# Patient Record
Sex: MALE
[De-identification: names, ages, dates, MRNs, and addresses within clinical notes are randomized per-mention and may not be internally consistent; named-entity substitution may affect disease eponyms.]

---

## 2024-04-10 ENCOUNTER — NON-APPOINTMENT (OUTPATIENT)
Age: 24
End: 2024-04-10

## 2024-04-10 ENCOUNTER — APPOINTMENT (OUTPATIENT)
Dept: GASTROENTEROLOGY | Facility: CLINIC | Age: 24
End: 2024-04-10
Payer: COMMERCIAL

## 2024-04-10 VITALS
WEIGHT: 205 LBS | DIASTOLIC BLOOD PRESSURE: 70 MMHG | BODY MASS INDEX: 24.96 KG/M2 | HEIGHT: 76 IN | SYSTOLIC BLOOD PRESSURE: 110 MMHG

## 2024-04-10 DIAGNOSIS — R19.7 DIARRHEA, UNSPECIFIED: ICD-10-CM

## 2024-04-10 PROBLEM — Z00.00 ENCOUNTER FOR PREVENTIVE HEALTH EXAMINATION: Status: ACTIVE | Noted: 2024-04-10

## 2024-04-10 PROCEDURE — 99203 OFFICE O/P NEW LOW 30 MIN: CPT

## 2024-04-10 NOTE — HISTORY OF PRESENT ILLNESS
[FreeTextEntry1] : Mr. JESSICA BELLA is a 23-year-old male with no PMH. Nickname is Jun his middle name.  No PSH.   Presents for diarrhea, rectal bleeding, L side abdominal pain which is now resolved. Denies recent travel, food contamination however eats a lot sushi.  No pain when passing stools. Blood on toilet paper after he would strain during BM, has not seen in a few weeks, states would occur infrequently cannot recall how often.  Relieved when he would have a BM.  Lost 22 pounds, because he was afraid to eat during this period. He only ate bone broth.  Now his weight is stable.  Endorses nocturnal awakenings at the time, 5-6 BMs per day, he thought he saw a worm in stool at one point. Started Garlic capsule daily.  Stools are still soft but not mushy.   Feels "great" now, no symptoms and has more energy.  FH Paternal cousin has "colitis" but unsure what kind.  Maternal aunt has ulcerative colitis. Mother had a normal colonoscopy, father has never had a colonoscopy.

## 2024-04-10 NOTE — ASSESSMENT
[FreeTextEntry1] : Abdominal pain, Diarrhea - Symptoms now resolved, but stools not completely back to normal.  - Will check stool studies. Patient instructed to contact office if symptoms recur.  - Maintain healthy, well-balanced diet and exercise.

## 2024-07-30 DIAGNOSIS — B80 ENTEROBIASIS: ICD-10-CM

## 2024-07-30 RX ORDER — ALBENDAZOLE 200 MG/1
200 TABLET ORAL ONCE
Qty: 4 | Refills: 0 | Status: ACTIVE | COMMUNITY
Start: 2024-07-30 | End: 1900-01-01

## 2024-08-01 DIAGNOSIS — R19.7 DIARRHEA, UNSPECIFIED: ICD-10-CM

## 2024-08-11 LAB
CDIFF BY PCR: NOT DETECTED
DEPRECATED O AND P PREP STL: NORMAL
ENTEROPATHOGENIC E. COLI (EPEC): DETECTED
GI PCR PANEL: DETECTED

## 2024-08-13 DIAGNOSIS — A04.4 OTHER INTESTINAL ESCHERICHIA COLI INFECTIONS: ICD-10-CM

## 2024-08-20 ENCOUNTER — APPOINTMENT (OUTPATIENT)
Dept: GASTROENTEROLOGY | Facility: CLINIC | Age: 24
End: 2024-08-20
Payer: COMMERCIAL

## 2024-08-20 DIAGNOSIS — R19.7 DIARRHEA, UNSPECIFIED: ICD-10-CM

## 2024-08-20 PROBLEM — R19.5 HIGH FECAL CALPROTECTIN: Status: ACTIVE | Noted: 2024-08-14

## 2024-08-20 PROCEDURE — 99442: CPT

## 2024-08-20 RX ORDER — AZITHROMYCIN 500 MG/1
500 TABLET, FILM COATED ORAL
Qty: 1 | Refills: 0 | Status: COMPLETED | COMMUNITY
Start: 2024-08-13 | End: 2024-08-20

## 2024-08-20 NOTE — HISTORY OF PRESENT ILLNESS
[FreeTextEntry1] : Mr. JESSICA BELLA is a 23-year-old male with no PMH. Nickname is Jun his middle name.  No PSH.   Patient presents for follow-up telephonic.  Completed stool tests last week that was notable for EPEC treated with Azithromycin. Mother was concerned he is passing worms in stools daily but could just be mucus as O&P negative, PCR came back EPEC.  Fecal calprotectin and lactoferrin were also strikingly elevated. Denies abdominal pain, fatigue fever, urgency, diarrhea, bloody or black stools.  Being patient has had GI upset for several months, recommend proceed with a colonoscopy.   Prior visit: Presents for diarrhea, rectal bleeding, L side abdominal pain which is now resolved. Denies recent travel, food contamination however eats a lot sushi.  No pain when passing stools. Blood on toilet paper after he would strain during BM, has not seen in a few weeks, states would occur infrequently cannot recall how often.  Relieved when he would have a BM.  Lost 22 pounds, because he was afraid to eat during this period. He only ate bone broth.  Now his weight is stable.  Endorses nocturnal awakenings at the time, 5-6 BMs per day, he thought he saw a worm in stool at one point. Started Garlic capsule daily.  Stools are still soft but not mushy.   Feels "great" now, no symptoms and has more energy.  FH Paternal cousin has "colitis" but unsure what kind.  Maternal aunt has ulcerative colitis. Mother had a normal colonoscopy, father has never had a colonoscopy.

## 2024-08-20 NOTE — ASSESSMENT
[FreeTextEntry1] : Elevated fecal calprotectin, history of rectal bleeding and abdominal pain - Repeat fecal calpro and lactoferrin next week. Unless completely normal recommend proceeding with a colonoscopy. Explained to patient and mother we do not typically "trend" this marker.  - Will repeat O&P x 3 to rule out worms.  - Schedule colonoscopy with Dr. Gabriel.   Colonoscopy with Dr. Gabriel - Indications: Elevated fecal calprotectin - Reviewed importance of adequate colon cleansing prior to colonoscopy. Instructed to contact office if he/she has any questions regarding prep. - Explained the risks (including but not limited to cardiopulmonary anesthetic complications, bleeding, perforation, aspiration, missed lesions and misidentified sites of lesions - complications that might necessitate hospitalization or surgery), benefits and alternatives of colonoscopy were reviewed with the patient. Preparation for the procedure was reviewed with the patient. The patient was informed that she/he would be given intravenous anesthesia by an anesthesiologist for the procedure. The patient was informed that a family member or friend must drive the patient following recovery from the procedure. Patient understands and agrees, all questions answered. - Holds: None - Prep: Clenpiq

## 2024-08-20 NOTE — REASON FOR VISIT
[Home] : at home, [unfilled] , at the time of the visit. [Medical Office: ([facility])___] : at the medical office located in  [Mother] : mother [Verbal consent obtained from patient] : the patient, [unfilled] [Follow-up] : a follow-up of an existing diagnosis

## 2024-08-28 DIAGNOSIS — R19.5 OTHER FECAL ABNORMALITIES: ICD-10-CM

## 2024-08-28 RX ORDER — SODIUM PICOSULFATE, MAGNESIUM OXIDE, AND ANHYDROUS CITRIC ACID 12; 3.5; 1 G/175ML; G/175ML; MG/175ML
10-3.5-12 MG-GM LIQUID ORAL
Qty: 2 | Refills: 0 | Status: ACTIVE | COMMUNITY
Start: 2024-08-28 | End: 1900-01-01

## 2024-10-04 ENCOUNTER — APPOINTMENT (OUTPATIENT)
Dept: GASTROENTEROLOGY | Facility: HOSPITAL | Age: 24
End: 2024-10-04